# Patient Record
Sex: MALE | Race: OTHER | NOT HISPANIC OR LATINO | URBAN - METROPOLITAN AREA
[De-identification: names, ages, dates, MRNs, and addresses within clinical notes are randomized per-mention and may not be internally consistent; named-entity substitution may affect disease eponyms.]

---

## 2017-06-17 ENCOUNTER — INPATIENT (INPATIENT)
Facility: HOSPITAL | Age: 72
LOS: 2 days | Discharge: HOME | DRG: 313 | End: 2017-06-20
Attending: INTERNAL MEDICINE | Admitting: INTERNAL MEDICINE
Payer: MEDICARE

## 2017-06-17 VITALS
SYSTOLIC BLOOD PRESSURE: 99 MMHG | HEART RATE: 92 BPM | TEMPERATURE: 99 F | RESPIRATION RATE: 18 BRPM | DIASTOLIC BLOOD PRESSURE: 63 MMHG | OXYGEN SATURATION: 96 %

## 2017-06-17 DIAGNOSIS — I24.9 ACUTE ISCHEMIC HEART DISEASE, UNSPECIFIED: ICD-10-CM

## 2017-06-17 DIAGNOSIS — D69.6 THROMBOCYTOPENIA, UNSPECIFIED: ICD-10-CM

## 2017-06-17 DIAGNOSIS — D61.818 OTHER PANCYTOPENIA: ICD-10-CM

## 2017-06-17 DIAGNOSIS — R30.0 DYSURIA: ICD-10-CM

## 2017-06-17 DIAGNOSIS — R06.02 SHORTNESS OF BREATH: ICD-10-CM

## 2017-06-17 DIAGNOSIS — D64.9 ANEMIA, UNSPECIFIED: ICD-10-CM

## 2017-06-17 LAB
CK MB CFR SERPL CALC: 1.3 NG/ML — SIGNIFICANT CHANGE UP (ref 0–6.7)
CK MB CFR SERPL CALC: <1 NG/ML — SIGNIFICANT CHANGE UP (ref 0–6.7)
CK SERPL-CCNC: 60 U/L — SIGNIFICANT CHANGE UP (ref 30–200)
CK SERPL-CCNC: 74 U/L — SIGNIFICANT CHANGE UP (ref 30–200)
CRP SERPL-MCNC: 3.5 MG/DL — HIGH (ref 0–0.4)
D DIMER BLD IA.RAPID-MCNC: 615 NG/ML DDU — HIGH
ERYTHROCYTE [SEDIMENTATION RATE] IN BLOOD: 46 MM/HR — HIGH
FERRITIN SERPL-MCNC: 42.4 NG/ML — SIGNIFICANT CHANGE UP (ref 30–400)
FIBRINOGEN PPP-MCNC: 256 MG/DL — LOW (ref 258–438)
HAPTOGLOB SERPL-MCNC: <10 MG/DL — LOW (ref 34–200)
HBV CORE AB SER-ACNC: SIGNIFICANT CHANGE UP
HBV SURFACE AB SER-ACNC: SIGNIFICANT CHANGE UP
HBV SURFACE AG SER-ACNC: SIGNIFICANT CHANGE UP
HCV AB S/CO SERPL IA: 0.36 S/CO — SIGNIFICANT CHANGE UP
HCV AB SERPL-IMP: SIGNIFICANT CHANGE UP
INR BLD: 1.19 — HIGH (ref 0.88–1.16)
IRON SATN MFR SERPL: 33 % — SIGNIFICANT CHANGE UP (ref 16–55)
IRON SATN MFR SERPL: 95 UG/DL — SIGNIFICANT CHANGE UP (ref 45–165)
LDH SERPL L TO P-CCNC: 314 U/L — HIGH (ref 50–242)
PROTHROM AB SERPL-ACNC: 13.3 SEC — HIGH (ref 9.8–12.7)
RBC # BLD: 3.7 M/UL — LOW (ref 4.2–5.8)
RETICS/RBC NFR: 4.1 % — HIGH (ref 0.5–2.5)
TIBC SERPL-MCNC: 291 UG/DL — SIGNIFICANT CHANGE UP (ref 220–430)
TROPONIN T SERPL-MCNC: <0.01 NG/ML — SIGNIFICANT CHANGE UP (ref 0–0.01)
TROPONIN T SERPL-MCNC: <0.01 NG/ML — SIGNIFICANT CHANGE UP (ref 0–0.01)
UIBC SERPL-MCNC: 196 UG/DL — SIGNIFICANT CHANGE UP (ref 110–370)

## 2017-06-17 PROCEDURE — 93010 ELECTROCARDIOGRAM REPORT: CPT | Mod: GC

## 2017-06-17 PROCEDURE — 99285 EMERGENCY DEPT VISIT HI MDM: CPT | Mod: 25,GC

## 2017-06-17 PROCEDURE — 71010: CPT | Mod: 26

## 2017-06-17 RX ORDER — ASPIRIN/CALCIUM CARB/MAGNESIUM 324 MG
1 TABLET ORAL
Qty: 0 | Refills: 0 | COMMUNITY

## 2017-06-17 RX ORDER — ASPIRIN/CALCIUM CARB/MAGNESIUM 324 MG
81 TABLET ORAL DAILY
Qty: 0 | Refills: 0 | Status: DISCONTINUED | OUTPATIENT
Start: 2017-06-17 | End: 2017-06-20

## 2017-06-17 RX ORDER — NITROGLYCERIN 6.5 MG
0.4 CAPSULE, EXTENDED RELEASE ORAL ONCE
Qty: 0 | Refills: 0 | Status: COMPLETED | OUTPATIENT
Start: 2017-06-17 | End: 2017-06-17

## 2017-06-17 RX ORDER — POTASSIUM CHLORIDE 20 MEQ
40 PACKET (EA) ORAL ONCE
Qty: 0 | Refills: 0 | Status: COMPLETED | OUTPATIENT
Start: 2017-06-17 | End: 2017-06-17

## 2017-06-17 RX ORDER — FUROSEMIDE 40 MG
20 TABLET ORAL ONCE
Qty: 0 | Refills: 0 | Status: COMPLETED | OUTPATIENT
Start: 2017-06-17 | End: 2017-06-17

## 2017-06-17 RX ADMIN — Medication 40 MILLIEQUIVALENT(S): at 18:50

## 2017-06-17 RX ADMIN — Medication 0.4 MILLIGRAM(S): at 11:43

## 2017-06-17 RX ADMIN — Medication 20 MILLIGRAM(S): at 15:50

## 2017-06-17 RX ADMIN — Medication 81 MILLIGRAM(S): at 18:50

## 2017-06-17 NOTE — H&P ADULT - PROBLEM SELECTOR PLAN 1
- CP free and HD stable  - monitor on telemetry  - trend trops  - possible NST vs cath Monday per Dr. Hidalgo

## 2017-06-17 NOTE — CONSULT NOTE ADULT - SUBJECTIVE AND OBJECTIVE BOX
Hematology Oncology Consult Note (Dr Herring)    The patient was seen and examined at bedside.    MARIE LICEA is a 71y Male with no prior medical history presented to [facility] with substernal chest pain admitted to [facility] for ACS r/o. Pt reports past hx of CVA (5 yrs ago) but denies any other medical problems. He had a 30 pk yr smoking hx, heavy EtOH use in past. Pt reports that he has noted easy bruising of his skin but denies any overt signs of bleeding. No blood in stool, urine, or from mouth/nose. Pt denies any fevers, chills, night sweats or weight loss. He has no history of blood clots in past. Pt does not have regular medical follow up. He only medication at home is asa 81 mg daily. He has no recent prior labs. Pt's last c-scope was > 10 yrs ago. Pt has not noted any changes in color of his urine.       ROS is otherwise negative.    PAST MEDICAL & SURGICAL HISTORY:  CVA (cerebral vascular accident)  No pertinent past medical history  No significant past surgical history      Allergies: NKDA      Medications:  MEDICATIONS  (STANDING):  aspirin enteric coated 81milliGRAM(s) Oral daily      Social History: current smoker, regular alcohol use     FAMILY HISTORY:  Family history of CHF (congestive heart failure) (Mother)  No pertinent family history in first degree relatives      PHYSICAL EXAM:    T(F): 98.4, Max: 98.9 (06-17 @ 10:06)  HR: 84 (83 - 92)  BP: 118/58 (99/63 - 155/67)  RR: 18 (17 - 18)  SpO2: 97% (96% - 98%)    Daily Height in cm: 175.26 (2017 14:24)    Daily Weight in k.1 (2017 14:24)    Gen: well developed, well nourished, comfortable  HEENT: normocephalic/atraumatic, no conjunctival pallor, no scleral icterus, no oral thrush/mucosal bleeding/mucositis  Neck: supple, no masses, no JVD  Back: nontender  Cardiovascular: RR, nl S1S2, no murmurs/rubs/gallops  Respiratory: bibasilar rales  Gastrointestinal: BS+, soft, NT/ND, no masses, no splenomegaly, no hepatomegaly, no evidence for ascites  Extremities: no clubbing/cyanosis, b/l LE Edema, no calf tenderness, DP/PT 2+ b/l  Neurological: no focal deficits  Skin: no rash on visible skin  Lymph Nodes:  no cervical/supraclavicular LAD, no axillary/groin LAD                              11.6   3.7   )-----------( 73       ( 2017 10:27 )             34.6         CBC Full  -  ( 2017 10:27 )  WBC Count : 3.7 K/uL  Hemoglobin : 11.6 g/dL  Hematocrit : 34.6 %  Platelet Count - Automated : 75 K/uL  Mean Cell Volume : 95.1 fL  Mean Cell Hemoglobin : 31.9 pg  Mean Cell Hemoglobin Concentration : 33.5 g/dL  Auto Neutrophil % : 55.5 %  Auto Lymphocyte % : 35.6 %  Auto Monocyte % : 5.9 %  Auto Eosinophil % : 2.7 %  Auto Basophil % : 0.3 %            135  |  102  |  12  ----------------------------<  131<H>  3.7   |  22  |  0.80    Ca    8.8      2017 10:27    TPro  6.8  /  Alb  2.7<L>  /  TBili  2.0<H>  /  DBili  x   /  AST  36  /  ALT  11  /  AlkPhos  112      PT/INR - ( 2017 10:28 )   PT: 13.3 sec;   INR: 1.19             Haptoglobin <10

## 2017-06-17 NOTE — CONSULT NOTE ADULT - PROBLEM SELECTOR RECOMMENDATION 2
etiology unclear, MCV 95, retic count 4.1 (adequate marrow response)  check iron studies, b12 and folate  - peripheral smear shows spherocytes- LDH elevated and Hapto <10 and indirect bilirubin elevated, suggestive hemolytic picture ( no shistocytes on smear to suggest microangiopathic process)  - please start folic acid 1 mg daily (after folate level drawn)  - check direct godwin  - check stool occult (lack of c-scope screening)

## 2017-06-17 NOTE — ED PROVIDER NOTE - MEDICAL DECISION MAKING DETAILS
72 yo M with no reported medical history, presents with acute chest pain, associated with SOB. Will obtain labs, CXR. Nitro SL to be given for persistent chest pain.

## 2017-06-17 NOTE — H&P ADULT - PROBLEM SELECTOR PLAN 3
- Dr. Awad of heme consulted  - follow up reticulocyte count, iron studies, B12, folate, hepatitis, HIV, LDH, haptoglobin, coags, fibrinogen  - follow up abd US

## 2017-06-17 NOTE — ED PROVIDER NOTE - OBJECTIVE STATEMENT
72 yo M with no reported PMH who presents following episode of chest pain. Patient states around 3am he was awakened by his daughter and son-in-law arguing when suddenly he developed acute 8/10 chest pain, located sternally (non-radiating) and described as a heaviness, associated with shortness of breath and diaphoresis, not associated with nasuea/vomiting. He then attempted to go back to sleep, however the chest pain persisted. He attempted to walk to the hospital, however he was stopped by a doorman at a nearby building because of his appearance, after which EMS was called. Currently patient reports moderate chest pain 6/10, that waxes and wanes, described as heaviness, non-radiated, associated with SOB. 70 yo M with no reported PMH who presents following episode of chest pain. Patient states around 3am he was awakened by his daughter and son-in-law arguing when suddenly he developed acute 8/10 chest pain, located sternally (non-radiating) and described as a heaviness, associated with shortness of breath and diaphoresis, not associated with nausea/vomiting. He then attempted to go back to sleep, however the chest pain persisted. He attempted to walk to the hospital, however he was stopped by a doorman at a nearby building because of his appearance, after which EMS was called. Currently patient reports moderate chest pain 6/10, that waxes and wanes, described as heaviness, non-radiated, associated with SOB.

## 2017-06-17 NOTE — ED PROVIDER NOTE - ATTENDING CONTRIBUTION TO CARE
72 yo +smoker with chest pain, non radiating with shortness of breath. Described as sternal heaviness. no back pain. Given asa prior to arrival. given SL nitro in ED relieving discomfort, given symptoms, age, risk factors, discussed with cardiology for admission for ACS eval. Less likely pe/dissection given symptoms and cp relieved with nitro.

## 2017-06-17 NOTE — ED ADULT TRIAGE NOTE - CHIEF COMPLAINT QUOTE
Patient BIBA c/o chest pain , dizziness and sob while walking in the street this morning . Denies any palpitations nor vomiting .

## 2017-06-17 NOTE — CONSULT NOTE ADULT - PROBLEM SELECTOR RECOMMENDATION 9
etiology unclear, CRP and ESR elevated suggesting myelosupression due to heavy alcohol intake- will need to wait for abd ultrasound to determine if bone marrow biopsy necessary (if patient has liver cirrhosis and hypersplenism on ultrasound, likely no inherent bone marrow malignancy present)  - f/u full abd ultrasound

## 2017-06-17 NOTE — ED PROVIDER NOTE - PROGRESS NOTE DETAILS
Mor: 72 yo with chest pain, non radiating with shortness of breath. Described as sternal heaviness. Given asa prior to arrival.

## 2017-06-17 NOTE — H&P ADULT - NSHPLABSRESULTS_GEN_ALL_CORE
11.6   3.7   )-----------( 73       ( 17 Jun 2017 10:27 )             34.6       06-17    135  |  102  |  12  ----------------------------<  131<H>  3.7   |  22  |  0.80    Ca    8.8      17 Jun 2017 10:27    TPro  6.8  /  Alb  2.7<L>  /  TBili  2.0<H>  /  DBili  x   /  AST  36  /  ALT  11  /  AlkPhos  112  06-17          CARDIAC MARKERS ( 17 Jun 2017 10:27 )  x     / <0.01 ng/mL / 61 U/L / x     / <1.0 ng/mL

## 2017-06-17 NOTE — H&P ADULT - NSHPSOCIALHISTORY_GEN_ALL_CORE
Current ETOH user 1-2 beers/wk, former ETOH abuser, current cigarette smoker 1/2 PPD, former polysubstance abuser (cocaine, marajuana)

## 2017-06-17 NOTE — CONSULT NOTE ADULT - PROBLEM SELECTOR RECOMMENDATION 3
etiology multifactorial- no evidence of microangiopathic picture  decreased albumin, elevated PT and decreased fibrinogen suggest liver disease with hypersplenism resulting in decreased plts.  - fibrinogen low normal suggestive of possible DIC (however no shistocytes on smear)  - check hepatitis panel, HIV  - obtain collateral labs from outpt setting.  - will discuss case with Dr Herring etiology multifactorial- no evidence of microangiopathic picture  decreased albumin, elevated PT and decreased fibrinogen suggest liver disease with hypersplenism resulting in decreased plt- f/u abd ultrasound  - fibrinogen low normal suggestive of possible DIC (however no shistocytes on smear)  - check hepatitis panel, HIV  - obtain collateral labs from outpt setting.  - will discuss case with Dr Herring

## 2017-06-17 NOTE — H&P ADULT - FAMILY HISTORY
Mother  Still living? Unknown  Family history of CHF (congestive heart failure), Age at diagnosis: Age Unknown

## 2017-06-17 NOTE — ED ADULT NURSE NOTE - OBJECTIVE STATEMENT
70 y/o male w/ no PMH or on any daily meds c/o sharp 7/10 chest pain when walking with slight relief at rest.

## 2017-06-17 NOTE — H&P ADULT - PROBLEM SELECTOR PLAN 2
- O2 sat 84% RA, improved to 96% on 4L NC, appears overloaded on exam and CXR appears congested  - daily weights and I&Os  - lasix 20mg IV x 1 ordered, assess need for continued diuresis  - follow up echo  - follow up d-dimer

## 2017-06-17 NOTE — H&P ADULT - HISTORY OF PRESENT ILLNESS
Patient is a 72yo M, current smoker, former ETOH (currently drinks 1-2 beers/weekly) and polysubstance abuser, with FHx heart disease (mother and fathers family) and PMHx CVA (5yrs ago, no residual deficits) who was BIBEMS to [facility] ED on 6/17/17 complaining of 6/10, dull SSCP with associated SOB, palpitations and diaphoresis. Patient reports his family was arguing and he left the house and started walking when his symptoms started. Patient states his symptoms were mildly relieved with rest. Patient started to walk to the ED, but was stopped by a doorman who told him he looked terrible and called an ambulance. Patient also endorses orthopnea, LE edema and easy bruising. Upon arrival to [facility] ED Temp 98.9F, HR 92bpm, BP 99/63, RR 18, O2 sat 84% RA. Labs significant for pancytopenia (WBC 3.7, Hgb/Hct 11.6/34.6, Plt 75), neg trop x 1, . EKG revealed NSR @ 89bpm with no ischemic changes. Patient was given NTG SL 0.4mg x 1 with relief of chest pain. Patient is admitted to 5 Ur for rule out ACS and further cardiac management.

## 2017-06-17 NOTE — PROGRESS NOTE ADULT - SUBJECTIVE AND OBJECTIVE BOX
Pt is admitted for onset of chest pain and dyspnea  assoc with mild leg edema    PAST MEDICAL & SURGICAL HISTORY:  No pertinent past medical history  pending reconciliation   No significant past surgical history    MEDICATIONS  (STANDING):  none by history    cp relieved by NTG    MEDICATIONS  (PRN):      ICU Vital Signs Last 24 Hrs  T(C): 37.2, Max: 37.2 (06-17 @ 10:06)  T(F): 98.9, Max: 98.9 (06-17 @ 10:06)  HR: 89 (89 - 92)  BP: 120/57 (99/63 - 120/57)  BP(mean): --  ABP: --  ABP(mean): --  RR: 18 (18 - 18)  SpO2: 98% (96% - 98%)    Lungs decreased breath sounds at base  CV s1 s2   Abd soft  Etx 1 plus edema                          11.6   3.7   )-----------( 73       ( 17 Jun 2017 10:27 )             34.6   06-17    135  |  102  |  12  ----------------------------<  131<H>  3.7   |  22  |  0.80    Ca    8.8      17 Jun 2017 10:27    TPro  6.8  /  Alb  2.7<L>  /  TBili  2.0<H>  /  DBili  x   /  AST  36  /  ALT  11  /  AlkPhos  112  06-17  EKG NSR no acute change    CARDIAC MARKERS ( 17 Jun 2017 10:27 )  x     / <0.01 ng/mL / 61 U/L / x     / <1.0 ng/mL

## 2017-06-18 LAB
ALBUMIN SERPL ELPH-MCNC: 2.7 G/DL — LOW (ref 3.3–5)
ALP SERPL-CCNC: 105 U/L — SIGNIFICANT CHANGE UP (ref 40–120)
ALT FLD-CCNC: 11 U/L — SIGNIFICANT CHANGE UP (ref 10–45)
AMPHET UR-MCNC: NEGATIVE — SIGNIFICANT CHANGE UP
ANION GAP SERPL CALC-SCNC: 11 MMOL/L — SIGNIFICANT CHANGE UP (ref 5–17)
APPEARANCE UR: CLEAR — SIGNIFICANT CHANGE UP
APTT BLD: 32.2 SEC — SIGNIFICANT CHANGE UP (ref 27.5–37.4)
AST SERPL-CCNC: 34 U/L — SIGNIFICANT CHANGE UP (ref 10–40)
BARBITURATES UR SCN-MCNC: NEGATIVE — SIGNIFICANT CHANGE UP
BENZODIAZ UR-MCNC: NEGATIVE — SIGNIFICANT CHANGE UP
BILIRUB SERPL-MCNC: 1.8 MG/DL — HIGH (ref 0.2–1.2)
BILIRUB UR-MCNC: NEGATIVE — SIGNIFICANT CHANGE UP
BUN SERPL-MCNC: 11 MG/DL — SIGNIFICANT CHANGE UP (ref 7–23)
CALCIUM SERPL-MCNC: 8.5 MG/DL — SIGNIFICANT CHANGE UP (ref 8.4–10.5)
CHLORIDE SERPL-SCNC: 105 MMOL/L — SIGNIFICANT CHANGE UP (ref 96–108)
CO2 SERPL-SCNC: 22 MMOL/L — SIGNIFICANT CHANGE UP (ref 22–31)
COCAINE METAB.OTHER UR-MCNC: NEGATIVE — SIGNIFICANT CHANGE UP
COLOR SPEC: YELLOW — SIGNIFICANT CHANGE UP
CREAT SERPL-MCNC: 0.9 MG/DL — SIGNIFICANT CHANGE UP (ref 0.5–1.3)
DIFF PNL FLD: (no result)
FIBRINOGEN PPP-MCNC: 249 MG/DL — LOW (ref 258–438)
FOLATE SERPL-MCNC: 9.5 NG/ML — SIGNIFICANT CHANGE UP (ref 4.8–24.2)
GLUCOSE SERPL-MCNC: 91 MG/DL — SIGNIFICANT CHANGE UP (ref 70–99)
GLUCOSE UR QL: NEGATIVE — SIGNIFICANT CHANGE UP
HAPTOGLOB SERPL-MCNC: 12 MG/DL — LOW (ref 34–200)
HBA1C BLD-MCNC: 5.5 % — SIGNIFICANT CHANGE UP (ref 4–5.6)
HCT VFR BLD CALC: 34.8 % — LOW (ref 39–50)
HGB BLD-MCNC: 11.4 G/DL — LOW (ref 13–17)
KETONES UR-MCNC: NEGATIVE — SIGNIFICANT CHANGE UP
LDH SERPL L TO P-CCNC: 257 U/L — HIGH (ref 50–242)
LEUKOCYTE ESTERASE UR-ACNC: NEGATIVE — SIGNIFICANT CHANGE UP
MAGNESIUM SERPL-MCNC: 2.1 MG/DL — SIGNIFICANT CHANGE UP (ref 1.6–2.6)
MCHC RBC-ENTMCNC: 31.6 PG — SIGNIFICANT CHANGE UP (ref 27–34)
MCHC RBC-ENTMCNC: 32.8 G/DL — SIGNIFICANT CHANGE UP (ref 32–36)
MCV RBC AUTO: 96.4 FL — SIGNIFICANT CHANGE UP (ref 80–100)
METHADONE UR-MCNC: NEGATIVE — SIGNIFICANT CHANGE UP
NITRITE UR-MCNC: NEGATIVE — SIGNIFICANT CHANGE UP
OPIATES UR-MCNC: NEGATIVE — SIGNIFICANT CHANGE UP
PCP SPEC-MCNC: SIGNIFICANT CHANGE UP
PCP UR-MCNC: NEGATIVE — SIGNIFICANT CHANGE UP
PH UR: 6.5 — SIGNIFICANT CHANGE UP (ref 5–8)
PLATELET # BLD AUTO: 73 K/UL — LOW (ref 150–400)
POTASSIUM SERPL-MCNC: 4.5 MMOL/L — SIGNIFICANT CHANGE UP (ref 3.5–5.3)
POTASSIUM SERPL-SCNC: 4.5 MMOL/L — SIGNIFICANT CHANGE UP (ref 3.5–5.3)
PROT SERPL-MCNC: 6.7 G/DL — SIGNIFICANT CHANGE UP (ref 6–8.3)
PROT UR-MCNC: NEGATIVE MG/DL — SIGNIFICANT CHANGE UP
RBC # BLD: 3.61 M/UL — LOW (ref 4.2–5.8)
RBC # FLD: 18.6 % — HIGH (ref 10.3–16.9)
SODIUM SERPL-SCNC: 138 MMOL/L — SIGNIFICANT CHANGE UP (ref 135–145)
SP GR SPEC: <=1.005 — SIGNIFICANT CHANGE UP (ref 1–1.03)
THC UR QL: NEGATIVE — SIGNIFICANT CHANGE UP
UROBILINOGEN FLD QL: 0.2 E.U./DL — SIGNIFICANT CHANGE UP
VIT B12 SERPL-MCNC: 574 PG/ML — SIGNIFICANT CHANGE UP (ref 243–894)
WBC # BLD: 3.6 K/UL — LOW (ref 3.8–10.5)
WBC # FLD AUTO: 3.6 K/UL — LOW (ref 3.8–10.5)

## 2017-06-18 PROCEDURE — 71275 CT ANGIOGRAPHY CHEST: CPT | Mod: 26

## 2017-06-18 PROCEDURE — 76700 US EXAM ABDOM COMPLETE: CPT | Mod: 26

## 2017-06-18 RX ORDER — HEPARIN SODIUM 5000 [USP'U]/ML
5000 INJECTION INTRAVENOUS; SUBCUTANEOUS EVERY 8 HOURS
Qty: 0 | Refills: 0 | Status: DISCONTINUED | OUTPATIENT
Start: 2017-06-18 | End: 2017-06-18

## 2017-06-18 RX ORDER — NITROGLYCERIN 6.5 MG
0.4 CAPSULE, EXTENDED RELEASE ORAL ONCE
Qty: 0 | Refills: 0 | Status: COMPLETED | OUTPATIENT
Start: 2017-06-18 | End: 2017-06-18

## 2017-06-18 RX ADMIN — Medication 0.4 MILLIGRAM(S): at 00:39

## 2017-06-18 RX ADMIN — Medication 81 MILLIGRAM(S): at 15:43

## 2017-06-18 NOTE — PROGRESS NOTE ADULT - ASSESSMENT
Patient is a 72yo M, current smoker, former ETOH (currently drinks 1-2 beers/weekly) and polysubstance abuser, with FHx heart disease (mother and fathers family) and PMHx CVA (5yrs ago, no residual deficits) who was BIBEMS to [facility] ED on 6/17/17 complaining of 6/10, dull SSCP with associated SOB, palpitations and diaphoresis

## 2017-06-18 NOTE — PROGRESS NOTE ADULT - SUBJECTIVE AND OBJECTIVE BOX
Interventional Cardiology PA Adult Progress Note    Subjective Assessment: Patient seen and examined at bedside, no current cP.  	  MEDICATIONS:  aspirin enteric coated 81milliGRAM(s) Oral daily    [PHYSICAL EXAM:  TELEMETRY:  T(C): 36.3, Max: 36.9 (06-17 @ 20:50)  HR: 88 (76 - 88)  BP: 114/62 (93/49 - 126/58)  RR: 18 (16 - 18)  SpO2: 89% (89% - 97%)    I&O's Summary  I & Os for 24h ending 18 Jun 2017 07:00  =============================================  IN: 240 ml / OUT: 850 ml / NET: -610 ml    I & Os for current day (as of 18 Jun 2017 17:46)  =============================================  IN: 0 ml / OUT: 525 ml / NET: -525 ml    Ramon: No  Central/PICC/Mid Line: No                                         Appearance: Normal, NAD	  HEENT:   Normal oral mucosa, PERRL, EOMI	  Neck: Supple,  - JVD; Carotid Bruit   Cardiovascular: Normal S1 S2, No JVD, No murmurs,   Respiratory: Lungs clear to auscultation/No Rales, Rhonchi, Wheezing	  Gastrointestinal:  Soft, Non-tender, + BS	  Skin: No rashes, No ecchymoses, No cyanosis  Extremities: Normal range of motion, No clubbing, cyanosis or edema  Vascular: Peripheral pulses palpable 2+ bilaterally  Neurologic: Non-focal  Psychiatry: A & O x 3, Mood & affect appropriate  	    LABS:	 	  CARDIAC MARKERS:                        11.4   3.6   )-----------( 71       ( 18 Jun 2017 06:55 )             34.8     06-18    138  |  105  |  11  ----------------------------<  91  4.5   |  22  |  0.90    Ca    8.5      18 Jun 2017 06:55  Mg     2.1     06-18    TPro  6.7  /  Alb  2.7<L>  /  TBili  1.8<H>  /  DBili  x   /  AST  34  /  ALT  11  /  AlkPhos  105  06-18    HgA1c: Hemoglobin A1C, Whole Blood: 5.5 % (06-18 @ 06:55)    PT/INR - ( 17 Jun 2017 10:28 )   PT: 13.3 sec;   INR: 1.19     PTT - ( 18 Jun 2017 06:55 )  PTT:32.2 sec    ASSESSMENT/PLAN:

## 2017-06-18 NOTE — PROGRESS NOTE ADULT - SUBJECTIVE AND OBJECTIVE BOX
Pt feels better after sl NTG    PAST MEDICAL & SURGICAL HISTORY:  CVA (cerebral vascular accident)      No pertinent past medical history  No significant past surgical history    MEDICATIONS  (STANDING):  aspirin enteric coated 81milliGRAM(s) Oral daily    MEDICATIONS  (PRN):      ICU Vital Signs Last 24 Hrs  T(C): 36.9, Max: 36.9 (06-17 @ 20:50)  T(F): 98.5, Max: 98.5 (06-18 @ 09:11)  HR: 86 (83 - 87)  BP: 110/53 (93/49 - 155/67)  BP(mean): 71 (69 - 76)  ABP: --  ABP(mean): --  RR: 16 (16 - 18)  SpO2: 97% (95% - 97%)    Lungs clear  CV s1 S2   abd soft  ext stable                      11.4   3.6   )-----------( 71       ( 18 Jun 2017 06:55 )             34.8   06-18    138  |  105  |  11  ----------------------------<  91  4.5   |  22  |  0.90    Ca    8.5      18 Jun 2017 06:55  Mg     2.1     06-18    TPro  6.7  /  Alb  2.7<L>  /  TBili  1.8<H>  /  DBili  x   /  AST  34  /  ALT  11  /  AlkPhos  105  06-18      CARDIAC MARKERS ( 17 Jun 2017 22:56 )  x     / <0.01 ng/mL / 60 U/L / x     / <1.0 ng/mL  CARDIAC MARKERS ( 17 Jun 2017 17:05 )  x     / <0.01 ng/mL / 74 U/L / x     / 1.3 ng/mL  CARDIAC MARKERS ( 17 Jun 2017 10:27 )  x     / <0.01 ng/mL / 61 U/L / x     / <1.0 ng/mL

## 2017-06-18 NOTE — PROGRESS NOTE ADULT - ASSESSMENT
R/O ACS  as chest pain continues  consider cardiac Cath vs stress thallium  Echo pending R/O ACS  as chest pain continues  consider cardiac Cath vs stress thallium  Echo pending     Pancytopenia   Heme follow UP

## 2017-06-18 NOTE — PROGRESS NOTE ADULT - PROBLEM SELECTOR PLAN 2
etiology unclear, MCV 95, retic count 4.1 (adequate marrow response)  check iron studies, b12 and folate  - peripheral smear shows spherocytes- LDH elevated and Hapto <10 and indirect bilirubin elevated, suggestive hemolytic picture ( no shistocytes on smear to suggest microangiopathic process)  - please start folic acid 1 mg daily (after folate level drawn)  - check direct godwin  - check stool occult (lack of c-scope screening). etiology unclear, MCV 95, retic count 4.1 (adequate marrow response)  iron studies w/ no evidence of iron def, pending b12 and folate  check stool occult- given liver cirrhosis, concern for bleeding varices versus alcoholic gastritis  - peripheral smear shows spherocytes- LDH elevated and Hapto <10 and indirect bilirubin elevated, suggestive hemolytic picture ( no shistocytes on smear to suggest microangiopathic process)  - c/w folic acid 1 mg daily

## 2017-06-18 NOTE — PROGRESS NOTE ADULT - ASSESSMENT
70 yo M with no medical hx presenting with pancytopenia likely 2/2 to underlying cirhosis with hypersplenism

## 2017-06-18 NOTE — PROGRESS NOTE ADULT - PROBLEM SELECTOR PLAN 2
Patient appears [facility] today, satting well RA  - S/p Lasix 20mg IV x1 on 6/17, continue to assess need for Lasix  - f/u official CXR, Echo ordered.  - daily weights and I&Os  - D dimer elevated 615, CT PE Protocol negative for PE.

## 2017-06-18 NOTE — CHART NOTE - NSCHARTNOTEFT_GEN_A_CORE
Pt c/o chest pain, 5/10 L sided, states feels better than before, relieved with SL nitro, VSS, EKG unchanged from prior, no acute STT changes. Trop negative x3. Will continue to monitor

## 2017-06-18 NOTE — PROGRESS NOTE ADULT - SUBJECTIVE AND OBJECTIVE BOX
Hematology Oncology Consult Note (Dr Herring)    The patient was seen and examined at bedside.    no overnight events, no complaints. no further chest pain. ACS w/u in progress, plan for stress test angelo    ROS is otherwise negative.    PHYSICAL EXAM:    ICU Vital Signs Last 24 Hrs  T(C): 36.3, Max: 36.9 (06-17 @ 20:50)  T(F): 97.3, Max: 98.5 (06-18 @ 09:11)  HR: 88 (76 - 88)  BP: 114/62 (93/49 - 126/58)  BP(mean): 71 (69 - 76)  RR: 18 (16 - 18)  SpO2: 89% (89% - 97%)      Gen: well developed, well nourished, comfortable  HEENT: normocephalic/atraumatic, no conjunctival pallor, no scleral icterus, no oral thrush/mucosal bleeding/mucositis  Neck: supple, no masses, no JVD  Back: nontender  Cardiovascular: RR, nl S1S2, no murmurs/rubs/gallops  Respiratory: bibasilar rales  Gastrointestinal: BS+, soft, NT/ND, no masses, + splenomegaly, no evidence for ascites  Extremities: no clubbing/cyanosis, b/l LE Edema, no calf tenderness, DP/PT 2+ b/l  Neurological: no focal deficits  Skin: no rash on visible skin  Lymph Nodes:  no cervical/supraclavicular LAD, no axillary/groin LAD                              11.4   3.6   )-----------( 71       ( 18 Jun 2017 06:55 )             34.8         CBC Full  -  ( 18 Jun 2017 06:55 )  WBC Count : 3.6 K/uL  Hemoglobin : 11.4 g/dL  Hematocrit : 34.8 %  Platelet Count - Automated : 73 K/uL  Mean Cell Volume : 96.4 fL  Mean Cell Hemoglobin : 31.6 pg  Mean Cell Hemoglobin Concentration : 32.8 g/dL        06-18    138  |  105  |  11  ----------------------------<  91  4.5   |  22  |  0.90    Ca    8.5      18 Jun 2017 06:55  Mg     2.1     06-18    TPro  6.7  /  Alb  2.7<L>  /  TBili  1.8<H>  /  DBili  x   /  AST  34  /  ALT  11  /  AlkPhos  105  06-18        PT/INR - ( 17 Jun 2017 10:28 )   PT: 13.3 sec;   INR: 1.19          PTT - ( 18 Jun 2017 06:55 )  PTT:32.2 sec          Haptoglobin <10

## 2017-06-19 LAB
ANION GAP SERPL CALC-SCNC: 11 MMOL/L — SIGNIFICANT CHANGE UP (ref 5–17)
APTT BLD: 33.7 SEC — SIGNIFICANT CHANGE UP (ref 27.5–37.4)
BUN SERPL-MCNC: 10 MG/DL — SIGNIFICANT CHANGE UP (ref 7–23)
CALCIUM SERPL-MCNC: 8.8 MG/DL — SIGNIFICANT CHANGE UP (ref 8.4–10.5)
CHLORIDE SERPL-SCNC: 102 MMOL/L — SIGNIFICANT CHANGE UP (ref 96–108)
CHOLEST SERPL-MCNC: 116 MG/DL — SIGNIFICANT CHANGE UP (ref 10–199)
CO2 SERPL-SCNC: 22 MMOL/L — SIGNIFICANT CHANGE UP (ref 22–31)
CREAT SERPL-MCNC: 0.9 MG/DL — SIGNIFICANT CHANGE UP (ref 0.5–1.3)
CULTURE RESULTS: SIGNIFICANT CHANGE UP
GLUCOSE SERPL-MCNC: 101 MG/DL — HIGH (ref 70–99)
HCT VFR BLD CALC: 37 % — LOW (ref 39–50)
HDLC SERPL-MCNC: 41 MG/DL — SIGNIFICANT CHANGE UP (ref 40–125)
HGB BLD-MCNC: 11.8 G/DL — LOW (ref 13–17)
INR BLD: 1.16 — SIGNIFICANT CHANGE UP (ref 0.88–1.16)
LIPID PNL WITH DIRECT LDL SERPL: 60 MG/DL — SIGNIFICANT CHANGE UP
MAGNESIUM SERPL-MCNC: 2.1 MG/DL — SIGNIFICANT CHANGE UP (ref 1.6–2.6)
MCHC RBC-ENTMCNC: 30.7 PG — SIGNIFICANT CHANGE UP (ref 27–34)
MCHC RBC-ENTMCNC: 31.9 G/DL — LOW (ref 32–36)
MCV RBC AUTO: 96.4 FL — SIGNIFICANT CHANGE UP (ref 80–100)
PLATELET # BLD AUTO: 88 K/UL — LOW (ref 150–400)
POTASSIUM SERPL-MCNC: 4.3 MMOL/L — SIGNIFICANT CHANGE UP (ref 3.5–5.3)
POTASSIUM SERPL-SCNC: 4.3 MMOL/L — SIGNIFICANT CHANGE UP (ref 3.5–5.3)
PROTHROM AB SERPL-ACNC: 12.9 SEC — HIGH (ref 9.8–12.7)
RBC # BLD: 3.84 M/UL — LOW (ref 4.2–5.8)
RBC # FLD: 18.5 % — HIGH (ref 10.3–16.9)
SODIUM SERPL-SCNC: 135 MMOL/L — SIGNIFICANT CHANGE UP (ref 135–145)
SPECIMEN SOURCE: SIGNIFICANT CHANGE UP
TOTAL CHOLESTEROL/HDL RATIO MEASUREMENT: 2.8 RATIO — LOW (ref 3.4–9.6)
TRIGL SERPL-MCNC: 73 MG/DL — SIGNIFICANT CHANGE UP (ref 10–149)
WBC # BLD: 3.9 K/UL — SIGNIFICANT CHANGE UP (ref 3.8–10.5)
WBC # FLD AUTO: 3.9 K/UL — SIGNIFICANT CHANGE UP (ref 3.8–10.5)

## 2017-06-19 PROCEDURE — 93018 CV STRESS TEST I&R ONLY: CPT

## 2017-06-19 PROCEDURE — 93016 CV STRESS TEST SUPVJ ONLY: CPT

## 2017-06-19 PROCEDURE — 78452 HT MUSCLE IMAGE SPECT MULT: CPT | Mod: 26

## 2017-06-19 RX ADMIN — Medication 81 MILLIGRAM(S): at 11:39

## 2017-06-19 NOTE — PROGRESS NOTE ADULT - PROBLEM SELECTOR PLAN 3
Pancytopenia, Heme service consulted  - Likely secondary to cirrhosis (CT scan revealing cirrhotic liver w/ splenomegaly)  - Hep panel negative, Iron studies wnl, , Retic 4.1  - f/u Folate level and Start folic Acid 1mg  - Abd US 06/18/17 revealed minimally enlarged spleen measuring 13.5 cm. Small liver consistent with cirrhosis.  - f/u further Recs, patient to follow up with Dr. Hayden.
Pancytopenia, Heme service consulted  - Likely secondary to cirrhosis (CT scan revealing cirrhotic liver w/ splenomegaly)  - Hep panel negative, Iron studies wnl, , Retic 4.1  - f/u Folate level and Start folic Acid 1mg  - Abd US performed, f/u Results  - f/u further Recs, patient to follow up with Dr. Hayden.
etiology related to liver cirrhosis and destruction 2/2 to splenomegaly.

## 2017-06-19 NOTE — PROGRESS NOTE ADULT - SUBJECTIVE AND OBJECTIVE BOX
No current chest pain noted  ICU Vital Signs Last 24 Hrs  T(C): 36.6, Max: 37.4 (06-18 @ 21:27)  T(F): 97.8, Max: 99.3 (06-18 @ 21:27)  HR: 77 (76 - 88)  BP: 100/50 (98/54 - 126/58)  BP(mean): --  ABP: --  ABP(mean): --  RR: 16 (16 - 18)  SpO2: 95% (89% - 97%)    MEDICATIONS  (STANDING):  aspirin enteric coated 81milliGRAM(s) Oral daily    MEDICATIONS  (PRN):    PAST MEDICAL & SURGICAL HISTORY:  CVA (cerebral vascular accident)  No pertinent past medical history  No significant past surgical history        CT chest   No PE seen   Lung congestion /fibrosis  COPD  Cirrhosis    CV S1 S2   Abd soft  Ext stable                          11.8   3.9   )-----------( 86       ( 19 Jun 2017 06:33 )             37.0   06-19    135  |  102  |  10  ----------------------------<  101<H>  4.3   |  22  |  0.90    Ca    8.8      19 Jun 2017 06:33  Mg     2.1     06-19    TPro  6.7  /  Alb  2.7<L>  /  TBili  1.8<H>  /  DBili  x   /  AST  34  /  ALT  11  /  AlkPhos  105  06-18  CARDIAC MARKERS ( 17 Jun 2017 22:56 )  x     / <0.01 ng/mL / 60 U/L / x     / <1.0 ng/mL  CARDIAC MARKERS ( 17 Jun 2017 17:05 )  x     / <0.01 ng/mL / 74 U/L / x     / 1.3 ng/mL  CARDIAC MARKERS ( 17 Jun 2017 10:27 )  x     / <0.01 ng/mL / 61 U/L / x     / <1.0 ng/mL

## 2017-06-19 NOTE — PROGRESS NOTE ADULT - ASSESSMENT
Patient is a 70yo M, current smoker, former ETOH (currently drinks 1-2 beers/weekly) and polysubstance abuser, with FHx heart disease (mother and fathers family) and PMHx CVA (5yrs ago, no residual deficits) who was BIBEMS to [facility] ED on 6/17/17 complaining of 6/10, dull SSCP with associated SOB, palpitations and diaphoresis

## 2017-06-19 NOTE — PROGRESS NOTE ADULT - PROBLEM SELECTOR PLAN 2
Patient appears euvolemic today, satting well RA  - Echocardiogram in AM, please follow up results  - S/p Lasix 20mg IV x1 on 6/17, continue to assess need for Lasix  - f/u official CXR, Echo ordered.  - daily weights and I&Os  - D dimer elevated 615, CT PE Protocol negative for PE.

## 2017-06-19 NOTE — PROGRESS NOTE ADULT - SUBJECTIVE AND OBJECTIVE BOX
Interventional Cardiology PA Adult Progress Note    Subjective Assessment- Pt seen and examined at bedside. Feeling well, denies CP, SOB, palpitations.  	  MEDICATIONS:  aspirin enteric coated 81milliGRAM(s) Oral daily      	    [PHYSICAL EXAM:  TELEMETRY:  T(C): 36.4, Max: 37.4 (06-18 @ 21:27)  HR: 79 (77 - 87)  BP: 125/70 (98/54 - 125/70)  RR: 16 (16 - 16)  SpO2: 96% (95% - 98%)  Wt(kg): --  I&O's Summary  I & Os for 24h ending 19 Jun 2017 07:00  =============================================  IN: 0 ml / OUT: 1425 ml / NET: -1425 ml    I & Os for current day (as of 19 Jun 2017 17:58)  =============================================  IN: 0 ml / OUT: 600 ml / NET: -600 ml      Weight (kg): 77 (06-19 @ 06:05)  Ramon:  Central/PICC/Mid Line:                                         Appearance: Normal	  HEENT:   Normal oral mucosa, PERRL, EOMI	  Neck: Supple, - JVD; Carotid Bruit   Cardiovascular: Normal S1 S2, No JVD, No murmurs,   Respiratory: Lungs clear to auscultation, No Rales, Rhonchi, Wheezing	  Gastrointestinal:  Soft, Non-tender, + BS	  Skin: No rashes, No ecchymoses, No cyanosis  Extremities: Normal range of motion, No clubbing, cyanosis or edema  Vascular: Peripheral pulses palpable 2+ bilaterally  Neurologic: Non-focal  Psychiatry: A & O x 3, Mood & affect appropriate      	    ECG:  	  RADIOLOGY:   DIAGNOSTIC TESTING:  [ ] Echocardiogram:  [ ]  Catheterization:  [ ] Stress Test:    [ ] RICCARDO  OTHER: 	    LABS:	 	  CARDIAC MARKERS:                                  11.8   3.9   )-----------( 86       ( 19 Jun 2017 06:33 )             37.0     06-19    135  |  102  |  10  ----------------------------<  101<H>  4.3   |  22  |  0.90    Ca    8.8      19 Jun 2017 06:33  Mg     2.1     06-19    TPro  6.7  /  Alb  2.7<L>  /  TBili  1.8<H>  /  DBili  x   /  AST  34  /  ALT  11  /  AlkPhos  105  06-18    proBNP:   Lipid Profile:   HgA1c:   TSH:   PT/INR - ( 19 Jun 2017 06:33 )   PT: 12.9 sec;   INR: 1.16          PTT - ( 19 Jun 2017 06:33 )  PTT:33.7 sec

## 2017-06-19 NOTE — PROGRESS NOTE ADULT - PROBLEM SELECTOR PLAN 1
CP free today, CE negative x3  - NPO after midnight for NST tomorrow.  - Continue ASA 81mg QD  - f/u lipid panel
CP free today, CE negative x3  - NST 06/19/17 WNL  - Continue ASA 81mg QD  - Lipid Panel WNL
etiology 2/2 to underlying liver cirrhosis and splenomegaly as noted on CT scan, awaiting abd US to further evaluate.

## 2017-06-20 VITALS
OXYGEN SATURATION: 96 % | HEART RATE: 83 BPM | RESPIRATION RATE: 16 BRPM | DIASTOLIC BLOOD PRESSURE: 63 MMHG | SYSTOLIC BLOOD PRESSURE: 115 MMHG

## 2017-06-20 LAB
ANION GAP SERPL CALC-SCNC: 10 MMOL/L — SIGNIFICANT CHANGE UP (ref 5–17)
BUN SERPL-MCNC: 14 MG/DL — SIGNIFICANT CHANGE UP (ref 7–23)
CALCIUM SERPL-MCNC: 9.1 MG/DL — SIGNIFICANT CHANGE UP (ref 8.4–10.5)
CHLORIDE SERPL-SCNC: 103 MMOL/L — SIGNIFICANT CHANGE UP (ref 96–108)
CO2 SERPL-SCNC: 21 MMOL/L — LOW (ref 22–31)
CREAT SERPL-MCNC: 0.9 MG/DL — SIGNIFICANT CHANGE UP (ref 0.5–1.3)
GLUCOSE SERPL-MCNC: 96 MG/DL — SIGNIFICANT CHANGE UP (ref 70–99)
HCT VFR BLD CALC: 34.2 % — LOW (ref 39–50)
HGB BLD-MCNC: 11.4 G/DL — LOW (ref 13–17)
MAGNESIUM SERPL-MCNC: 2 MG/DL — SIGNIFICANT CHANGE UP (ref 1.6–2.6)
MCHC RBC-ENTMCNC: 31.7 PG — SIGNIFICANT CHANGE UP (ref 27–34)
MCHC RBC-ENTMCNC: 33.3 G/DL — SIGNIFICANT CHANGE UP (ref 32–36)
MCV RBC AUTO: 95 FL — SIGNIFICANT CHANGE UP (ref 80–100)
PLATELET # BLD AUTO: 78 K/UL — LOW (ref 150–400)
POTASSIUM SERPL-MCNC: 4.2 MMOL/L — SIGNIFICANT CHANGE UP (ref 3.5–5.3)
POTASSIUM SERPL-SCNC: 4.2 MMOL/L — SIGNIFICANT CHANGE UP (ref 3.5–5.3)
RBC # BLD: 3.6 M/UL — LOW (ref 4.2–5.8)
RBC # FLD: 18.6 % — HIGH (ref 10.3–16.9)
SODIUM SERPL-SCNC: 134 MMOL/L — LOW (ref 135–145)
WBC # BLD: 3.7 K/UL — LOW (ref 3.8–10.5)
WBC # FLD AUTO: 3.7 K/UL — LOW (ref 3.8–10.5)

## 2017-06-20 PROCEDURE — 80048 BASIC METABOLIC PNL TOTAL CA: CPT

## 2017-06-20 PROCEDURE — 83615 LACTATE (LD) (LDH) ENZYME: CPT

## 2017-06-20 PROCEDURE — 36415 COLL VENOUS BLD VENIPUNCTURE: CPT

## 2017-06-20 PROCEDURE — 71045 X-RAY EXAM CHEST 1 VIEW: CPT

## 2017-06-20 PROCEDURE — 80053 COMPREHEN METABOLIC PANEL: CPT

## 2017-06-20 PROCEDURE — 83036 HEMOGLOBIN GLYCOSYLATED A1C: CPT

## 2017-06-20 PROCEDURE — 85027 COMPLETE CBC AUTOMATED: CPT

## 2017-06-20 PROCEDURE — 83880 ASSAY OF NATRIURETIC PEPTIDE: CPT

## 2017-06-20 PROCEDURE — 99285 EMERGENCY DEPT VISIT HI MDM: CPT | Mod: 25

## 2017-06-20 PROCEDURE — 82550 ASSAY OF CK (CPK): CPT

## 2017-06-20 PROCEDURE — A9505: CPT

## 2017-06-20 PROCEDURE — 93005 ELECTROCARDIOGRAM TRACING: CPT

## 2017-06-20 PROCEDURE — 76700 US EXAM ABDOM COMPLETE: CPT

## 2017-06-20 PROCEDURE — 86140 C-REACTIVE PROTEIN: CPT

## 2017-06-20 PROCEDURE — 82607 VITAMIN B-12: CPT

## 2017-06-20 PROCEDURE — 80307 DRUG TEST PRSMV CHEM ANLYZR: CPT

## 2017-06-20 PROCEDURE — 83550 IRON BINDING TEST: CPT

## 2017-06-20 PROCEDURE — 83735 ASSAY OF MAGNESIUM: CPT

## 2017-06-20 PROCEDURE — 80061 LIPID PANEL: CPT

## 2017-06-20 PROCEDURE — 87086 URINE CULTURE/COLONY COUNT: CPT

## 2017-06-20 PROCEDURE — 85610 PROTHROMBIN TIME: CPT

## 2017-06-20 PROCEDURE — 85384 FIBRINOGEN ACTIVITY: CPT

## 2017-06-20 PROCEDURE — 87340 HEPATITIS B SURFACE AG IA: CPT

## 2017-06-20 PROCEDURE — 93017 CV STRESS TEST TRACING ONLY: CPT

## 2017-06-20 PROCEDURE — 86803 HEPATITIS C AB TEST: CPT

## 2017-06-20 PROCEDURE — 85045 AUTOMATED RETICULOCYTE COUNT: CPT

## 2017-06-20 PROCEDURE — 71275 CT ANGIOGRAPHY CHEST: CPT

## 2017-06-20 PROCEDURE — 85025 COMPLETE CBC W/AUTO DIFF WBC: CPT

## 2017-06-20 PROCEDURE — 86704 HEP B CORE ANTIBODY TOTAL: CPT

## 2017-06-20 PROCEDURE — 84484 ASSAY OF TROPONIN QUANT: CPT

## 2017-06-20 PROCEDURE — 85652 RBC SED RATE AUTOMATED: CPT

## 2017-06-20 PROCEDURE — 85730 THROMBOPLASTIN TIME PARTIAL: CPT

## 2017-06-20 PROCEDURE — 83010 ASSAY OF HAPTOGLOBIN QUANT: CPT

## 2017-06-20 PROCEDURE — 85379 FIBRIN DEGRADATION QUANT: CPT

## 2017-06-20 PROCEDURE — 82746 ASSAY OF FOLIC ACID SERUM: CPT

## 2017-06-20 PROCEDURE — 82553 CREATINE MB FRACTION: CPT

## 2017-06-20 PROCEDURE — 78452 HT MUSCLE IMAGE SPECT MULT: CPT

## 2017-06-20 PROCEDURE — 81001 URINALYSIS AUTO W/SCOPE: CPT

## 2017-06-20 PROCEDURE — 82728 ASSAY OF FERRITIN: CPT

## 2017-06-20 PROCEDURE — 86706 HEP B SURFACE ANTIBODY: CPT

## 2017-06-20 PROCEDURE — A9500: CPT

## 2017-06-20 PROCEDURE — 93306 TTE W/DOPPLER COMPLETE: CPT

## 2017-06-20 RX ORDER — FOLIC ACID 0.8 MG
1 TABLET ORAL
Qty: 30 | Refills: 2 | OUTPATIENT
Start: 2017-06-20 | End: 2017-09-17

## 2017-06-20 RX ORDER — FOLIC ACID 0.8 MG
1 TABLET ORAL DAILY
Qty: 0 | Refills: 0 | Status: DISCONTINUED | OUTPATIENT
Start: 2017-06-20 | End: 2017-06-20

## 2017-06-20 RX ADMIN — Medication 1 MILLIGRAM(S): at 13:21

## 2017-06-20 RX ADMIN — Medication 81 MILLIGRAM(S): at 11:49

## 2017-06-20 NOTE — DISCHARGE NOTE ADULT - PATIENT PORTAL LINK FT
“You can access the [facility] Patient Portal, offered by [facility], by registering with the following website: [email or web address]”

## 2017-06-20 NOTE — DISCHARGE NOTE ADULT - PLAN OF CARE
You presented with Chest pain and shortness of breath.  Your cardiac work up in the hospital was negative.  Your heart labs were normal.  an Echocardiogram revealed normal heart function.  A stress test was normal.  A CT scan of your chest did not reveal any clots in your Please follow up with your outpatient primary care doctor in 1-2 weeks at [place]. Please follow up with Dr. Hayden, call to make an appointment.  Follow up with planned outpatient GI appointment. You were found to have anemia, low blood count.  These low blood levels are consistent with liver cirrhosis probably secondary to alcohol use.    - continue to take folic acid 1mg daily  - STOP alcohol use. You presented with Chest pain and shortness of breath.  Your cardiac work up in the hospital was negative.  Your heart labs were normal.  an Echocardiogram revealed normal heart function.  A stress test was normal.  A CT scan of your chest did not reveal any clots in your lungs.  - continue to take aspirin 81mg daily.

## 2017-06-20 NOTE — DISCHARGE NOTE ADULT - ADDITIONAL INSTRUCTIONS
- Please follow up with your primary care doctor  - please follow up with the Hematologist Dr. Hayden. - Please follow up with your primary care doctor in 1-2 weeks.  Please discuss with PMD regarding home health aid or referrel to nursing care as patient high risk of leaving home.  - please follow up with the Hematologist Dr. Hayden.  Call the office to make an appointment.

## 2017-06-20 NOTE — DISCHARGE NOTE ADULT - MEDICATION SUMMARY - MEDICATIONS TO TAKE
I will START or STAY ON the medications listed below when I get home from the hospital:    aspirin 81 mg oral tablet  -- 1 tab(s) by mouth once a day  -- Indication: For CAD prevention    folic acid 1 mg oral tablet  -- 1 tab(s) by mouth once a day  -- Indication: For folic acid supplement

## 2017-06-20 NOTE — DISCHARGE NOTE ADULT - CARE PLAN
Principal Discharge DX:	Chest pain  Instructions for follow-up, activity and diet:	You presented with Chest pain and shortness of breath.  Your cardiac work up in the hospital was negative.  Your heart labs were normal.  an Echocardiogram revealed normal heart function.  A stress test was normal.  A CT scan of your chest did not reveal any clots in your  Secondary Diagnosis:	Thrombocytopenia Principal Discharge DX:	Chest pain  Goal:	Please follow up with your outpatient primary care doctor in 1-2 weeks at [place].  Instructions for follow-up, activity and diet:	You presented with Chest pain and shortness of breath.  Your cardiac work up in the hospital was negative.  Your heart labs were normal.  an Echocardiogram revealed normal heart function.  A stress test was normal.  A CT scan of your chest did not reveal any clots in your lungs.  - continue to take aspirin 81mg daily.  Secondary Diagnosis:	Thrombocytopenia  Goal:	Please follow up with Dr. Hayden, call to make an appointment.  Follow up with planned outpatient GI appointment.  Instructions for follow-up, activity and diet:	You were found to have anemia, low blood count.  These low blood levels are consistent with liver cirrhosis probably secondary to alcohol use.    - continue to take folic acid 1mg daily  - STOP alcohol use. Principal Discharge DX:	Chest pain  Goal:	Please follow up with your outpatient primary care doctor in 1-2 weeks at [place].  Instructions for follow-up, activity and diet:	You presented with Chest pain and shortness of breath.  Your cardiac work up in the hospital was negative.  Your heart labs were normal.  an Echocardiogram revealed normal heart function.  A stress test was normal.  A CT scan of your chest did not reveal any clots in your lungs.  - continue to take aspirin 81mg daily.  Secondary Diagnosis:	Thrombocytopenia  Goal:	Please follow up with Dr. Hayden, call to make an appointment.  Follow up with planned outpatient GI appointment.  Instructions for follow-up, activity and diet:	You were found to have anemia, low blood count.  These low blood levels are consistent with liver cirrhosis probably secondary to alcohol use.    - continue to take folic acid 1mg daily  - STOP alcohol use. Principal Discharge DX:	Chest pain  Goal:	Please follow up with your outpatient primary care doctor in 1-2 weeks at [place].  Instructions for follow-up, activity and diet:	You presented with Chest pain and shortness of breath.  Your cardiac work up in the hospital was negative.  Your heart labs were normal.  an Echocardiogram revealed normal heart function.  A stress test was normal.  A CT scan of your chest did not reveal any clots in your lungs.  - continue to take aspirin 81mg daily.  Secondary Diagnosis:	Thrombocytopenia  Goal:	Please follow up with Dr. Hayden, call to make an appointment.  Follow up with planned outpatient GI appointment.  Instructions for follow-up, activity and diet:	You were found to have anemia, low blood count.  These low blood levels are consistent with liver cirrhosis probably secondary to alcohol use.    - continue to take folic acid 1mg daily  - STOP alcohol use.

## 2017-06-20 NOTE — PROGRESS NOTE ADULT - SUBJECTIVE AND OBJECTIVE BOX
Pt is stable  No further CP    PAST MEDICAL & SURGICAL HISTORY:  CVA (cerebral vascular accident)    No significant past surgical history    MEDICATIONS  (STANDING):  aspirin enteric coated 81milliGRAM(s) Oral daily    MEDICATIONS  (PRN):      ICU Vital Signs Last 24 Hrs  T(C): 37, Max: 37.1 (06-20 @ 01:00)  T(F): 98.6, Max: 98.7 (06-20 @ 01:00)  HR: 97 (79 - 97)  BP: 113/97 (106/53 - 125/70)  BP(mean): --  ABP: --  ABP(mean): --  RR: 15 (15 - 16)  SpO2: 96% (95% - 97%)      Lungs clear  PE ruled out by CTA   enlarged mediastinal lymph nodes  early fibrosis  COPD   CV  S1 S2   Abd soft  Ext  stable                          11.4   3.7   )-----------( 78       ( 20 Jun 2017 05:54 )             34.2   06-20    134<L>  |  103  |  14  ----------------------------<  96  4.2   |  21<L>  |  0.90    Ca    9.1      20 Jun 2017 05:55  Mg     2.0     06-20    PT/INR - ( 19 Jun 2017 06:33 )   PT: 12.9 sec;   INR: 1.16          PTT - ( 19 Jun 2017 06:33 )  PTT:33.7 sec      Stress thallium neg for ischemia

## 2017-06-20 NOTE — DISCHARGE NOTE ADULT - HOSPITAL COURSE
Patient is a 72yo M, current smoker, former ETOH (currently drinks 1-2 beers/weekly) and polysubstance abuser, with FHx heart disease (mother and fathers family) and PMHx CVA (5yrs ago, no residual deficits) who was BIBEMS to [facility] ED on 6/17/17 complaining of 6/10, dull SSCP with associated SOB, palpitations and diaphoresis. Patient reports his family was arguing and he left the house and started walking when his symptoms started. Patient states his symptoms were mildly relieved with rest. Patient started to walk to the ED, but was stopped by a doorman who told him he looked terrible and called an ambulance. Patient also endorses orthopnea, LE edema and easy bruising. Upon arrival to [facility] ED Temp 98.9F, HR 92bpm, BP 99/63, RR 18, O2 sat 84% RA. Labs significant for pancytopenia (WBC 3.7, Hgb/Hct 11.6/34.6, Plt 75), neg trop x 1, . EKG revealed NSR @ 89bpm with no ischemic changes. Patient was given NTG SL 0.4mg x 1 with relief of chest pain. Patient is admitted to [facility] for rule out ACS and further cardiac management.    Patient is a 72yo M, current smoker, former ETOH (currently drinks 1-2 beers/weekly) and polysubstance abuser, with FHx heart disease (mother and fathers family) and PMHx CVA (5yrs ago, no residual deficits) who was BIBEMS to [facility] ED on 6/17/17 complaining of 6/10, dull SSCP with associated SOB, palpitations and diaphoresis    Problem/Plan - 1:  ·  Problem: ACS (acute coronary syndrome).  Plan: CP free today, CE negative x3  - NST 06/19/17 WNL  - Continue ASA 81mg QD  - Lipid Panel WNL.     Problem/Plan - 2:  ·  Problem: Shortness of breath.  Plan: Patient appears euvolemic today, satting well RA  - Echocardiogram in AM, please follow up results  - S/p Lasix 20mg IV x1 on 6/17, continue to assess need for Lasix  - f/u official CXR, Echo ordered.  - daily weights and I&Os  - D dimer elevated 615, CT PE Protocol negative for PE.     Problem/Plan - 3:  ·  Problem: Pancytopenia.  Plan: Pancytopenia, Heme service consulted  - Likely secondary to cirrhosis (CT scan revealing cirrhotic liver w/ splenomegaly)  - Hep panel negative, Iron studies wnl, , Retic 4.1  - f/u Folate level and Start folic Acid 1mg  - Abd US 06/18/17 revealed minimally enlarged spleen measuring 13.5 cm. Small liver consistent with cirrhosis.  - f/u further Recs, patient to follow up with Dr. Hayden.   Problem/Plan - 1:  ·  Problem: ACS (acute coronary syndrome).  Plan: CP free today, CE negative x3  - NST 06/19/17 WNL  - Continue ASA 81mg QD  - Lipid Panel WNL.     Problem/Plan - 2:  ·  Problem: Shortness of breath.  Plan: Patient appears euvolemic today, satting well RA  - Echocardiogram in AM, please follow up results  - S/p Lasix 20mg IV x1 on 6/17, continue to assess need for Lasix  - f/u official CXR, Echo ordered.  - daily weights and I&Os  - D dimer elevated 615, CT PE Protocol negative for PE.     Problem/Plan - 3:  ·  Problem: Pancytopenia.  Plan: Pancytopenia, Heme service consulted  - Likely secondary to cirrhosis (CT scan revealing cirrhotic liver w/ splenomegaly)  - Hep panel negative, Iron studies wnl, , Retic 4.1  - f/u Folate level and Start folic Acid 1mg  - Abd US 06/18/17 revealed minimally enlarged spleen measuring 13.5 cm. Small liver consistent with cirrhosis.  - f/u further Recs, patient to follow up with Dr. Hayden. 72yo M, current smoker, former ETOH (currently drinks 1-2 beers/weekly) and polysubstance abuser, with FHx heart disease (mother and fathers family) and PMHx CVA (5yrs ago, no residual deficits), recent Staph infection requiring 5 weeks of IV antibiotics via PICC line as per leonardo who was BIBEMS to [facility] ED on 6/17/17 complaining of 6/10, dull SSCP with associated SOB, palpitations and diaphoresis. Patient reports his family was arguing and he left the house and started walking when his symptoms started.  Patient was admitted for further cardiac work up.  Cardiac enzymes remained negative x3.  Patient remained CP free.  He underwent an Echo revealing EF 60-65% and no significant valve disease.  And nuclear stress test revealing normal mycoardial perfusion images.  Patient recommended to continue ASA 81mg daily.  Patient with elevated D dimer 615.  underwent CT PE protocol negative for PE.  Patient received Lasix 20mg IV x1 for SOB but patient remained euvolemic remainder of hospital course.  On arrival patient noted to have pancytopenia.  Hematology service consulted and likely secondary to liver cirrhosis and CT scan + for cirrhosis and Abd US 06/18/17 revealed minimally enlarged spleen measuring 13.5 cm. Small liver consistent with cirrhosis.  Patient recommended to continue folic acid and follow up with Dr. Hayden.  Labs, vitals, Meds reviewed with Dr. Hidalgo and patient deemed stable for discharge home.     Of note upon discharge called daughter Shaniceher Gramajo to discuss discharge plan.  Daughter states family was not aware of where patient has been and police have been involved.  Daughter states patient is a flight risk and tends to leave home to use alcohol.  As discussed with SW patient is likely not a candidate for [facility] as he is without physical needs.  Recommended patient to discuss with outpatient PMD regarding possible 24hrs aid or visiting nurse.  If patient comes back to [facility] please call daughter Shanice as soon as possible - [phone]

## 2017-06-20 NOTE — DISCHARGE NOTE ADULT - CARE PROVIDER_API CALL
Jose Roberto Hayden), Hematology; Internal Medicine; Medical Oncology  [address] [place] [address]  Phone: [phone]  Fax: [phone]

## 2017-06-22 DIAGNOSIS — R07.9 CHEST PAIN, UNSPECIFIED: ICD-10-CM

## 2017-06-22 DIAGNOSIS — F10.21 ALCOHOL DEPENDENCE, IN REMISSION: ICD-10-CM

## 2017-06-22 DIAGNOSIS — Z86.73 PERSONAL HISTORY OF TRANSIENT ISCHEMIC ATTACK (TIA), AND CEREBRAL INFARCTION WITHOUT RESIDUAL DEFICITS: ICD-10-CM

## 2017-06-22 DIAGNOSIS — D69.6 THROMBOCYTOPENIA, UNSPECIFIED: ICD-10-CM

## 2017-06-22 DIAGNOSIS — F14.10 COCAINE ABUSE, UNCOMPLICATED: ICD-10-CM

## 2017-06-22 DIAGNOSIS — Z88.0 ALLERGY STATUS TO PENICILLIN: ICD-10-CM

## 2017-06-22 DIAGNOSIS — F17.200 NICOTINE DEPENDENCE, UNSPECIFIED, UNCOMPLICATED: ICD-10-CM

## 2018-04-21 NOTE — PROGRESS NOTE ADULT - PROBLEM SELECTOR PROBLEM 2
Anemia, unspecified type
Shortness of breath
Shortness of breath
Prophylactic measure

## 2019-03-11 NOTE — ED ADULT NURSE NOTE - CAS DISCH ACCOMP BY
Detail Level: Simple Consent: The patient's consent was obtained including but not limited to risks of crusting, scabbing, blistering, scarring, darker or lighter pigmentary change, recurrence, incomplete removal and infection. Number Of Freeze-Thaw Cycles: 1 freeze-thaw cycle Render Post-Care Instructions In Note?: no Post-Care Instructions: I reviewed with the patient in detail post-care instructions. Patient is to wear sunprotection, and avoid picking at any of the treated lesions. Patient may apply Polysporin or Vaseline to crusted or scabbing areas. Duration Of Freeze Thaw-Cycle (Seconds): 0 RN/Transporter

## 2022-06-01 NOTE — ED ADULT NURSE NOTE - GASTROINTESTINAL ASSESSMENT
Detail Level: Zone Benzoyl Peroxide Counseling: Patient counseled that medicine may cause skin irritation and bleach clothing.  In the event of skin irritation, the patient was advised to reduce the amount of the drug applied or use it less frequently.   The patient verbalized understanding of the proper use and possible adverse effects of benzoyl peroxide.  All of the patient's questions and concerns were addressed. Sarecycline Pregnancy And Lactation Text: This medication is Pregnancy Category D and not consider safe during pregnancy. It is also excreted in breast milk. Topical Sulfur Applications Pregnancy And Lactation Text: This medication is Pregnancy Category C and has an unknown safety profile during pregnancy. It is unknown if this topical medication is excreted in breast milk. High Dose Vitamin A Pregnancy And Lactation Text: High dose vitamin A therapy is contraindicated during pregnancy and breast feeding. Azithromycin Pregnancy And Lactation Text: This medication is considered safe during pregnancy and is also secreted in breast milk. Use Enhanced Medication Counseling?: No Erythromycin Counseling:  I discussed with the patient the risks of erythromycin including but not limited to GI upset, allergic reaction, drug rash, diarrhea, increase in liver enzymes, and yeast infections. Birth Control Pills Pregnancy And Lactation Text: This medication should be avoided if pregnant and for the first 30 days post-partum. High Dose Vitamin A Counseling: Side effects reviewed, pt to contact office should one occur. Azithromycin Counseling:  I discussed with the patient the risks of azithromycin including but not limited to GI upset, allergic reaction, drug rash, diarrhea, and yeast infections. Aklief counseling:  Patient advised to apply a pea-sized amount only at bedtime and wait 30 minutes after washing their face before applying.  If too drying, patient may add a non-comedogenic moisturizer.  The most commonly reported side effects including irritation, redness, scaling, dryness, stinging, burning, itching, and increased risk of sunburn.  The patient verbalized understanding of the proper use and possible adverse effects of retinoids.  All of the patient's questions and concerns were addressed. Tazorac Pregnancy And Lactation Text: This medication is not safe during pregnancy. It is unknown if this medication is excreted in breast milk. Minocycline Counseling: Patient advised regarding possible photosensitivity and discoloration of the teeth, skin, lips, tongue and gums.  Patient instructed to avoid sunlight, if possible.  When exposed to sunlight, patients should wear protective clothing, sunglasses, and sunscreen.  The patient was instructed to call the office immediately if the following severe adverse effects occur:  hearing changes, easy bruising/bleeding, severe headache, or vision changes.  The patient verbalized understanding of the proper use and possible adverse effects of minocycline.  All of the patient's questions and concerns were addressed. Bactrim Counseling:  I discussed with the patient the risks of sulfa antibiotics including but not limited to GI upset, allergic reaction, drug rash, diarrhea, dizziness, photosensitivity, and yeast infections.  Rarely, more serious reactions can occur including but not limited to aplastic anemia, agranulocytosis, methemoglobinemia, blood dyscrasias, liver or kidney failure, lung infiltrates or desquamative/blistering drug rashes. Topical Clindamycin Counseling: Patient counseled that this medication may cause skin irritation or allergic reactions.  In the event of skin irritation, the patient was advised to reduce the amount of the drug applied or use it less frequently.   The patient verbalized understanding of the proper use and possible adverse effects of clindamycin.  All of the patient's questions and concerns were addressed. Benzoyl Peroxide Pregnancy And Lactation Text: This medication is Pregnancy Category C. It is unknown if benzoyl peroxide is excreted in breast milk. Erythromycin Pregnancy And Lactation Text: This medication is Pregnancy Category B and is considered safe during pregnancy. It is also excreted in breast milk. Dapsone Pregnancy And Lactation Text: This medication is Pregnancy Category C and is not considered safe during pregnancy or breast feeding. Winlevi Counseling:  I discussed with the patient the risks of topical clascoterone including but not limited to erythema, scaling, itching, and stinging. Patient voiced their understanding. Winlevi Pregnancy And Lactation Text: This medication is considered safe during pregnancy and breastfeeding. Dapsone Counseling: I discussed with the patient the risks of dapsone including but not limited to hemolytic anemia, agranulocytosis, rashes, methemoglobinemia, kidney failure, peripheral neuropathy, headaches, GI upset, and liver toxicity.  Patients who start dapsone require monitoring including baseline LFTs and weekly CBCs for the first month, then every month thereafter.  The patient verbalized understanding of the proper use and possible adverse effects of dapsone.  All of the patient's questions and concerns were addressed. Spironolactone Counseling: Patient advised regarding risks of diarrhea, abdominal pain, hyperkalemia, birth defects (for female patients), liver toxicity and renal toxicity. The patient may need blood work to monitor liver and kidney function and potassium levels while on therapy. The patient verbalized understanding of the proper use and possible adverse effects of spironolactone.  All of the patient's questions and concerns were addressed. Aklief Pregnancy And Lactation Text: It is unknown if this medication is safe to use during pregnancy.  It is unknown if this medication is excreted in breast milk.  Breastfeeding women should use the topical cream on the smallest area of the skin for the shortest time needed while breastfeeding.  Do not apply to nipple and areola. Doxycycline Counseling:  Patient counseled regarding possible photosensitivity and increased risk for sunburn.  Patient instructed to avoid sunlight, if possible.  When exposed to sunlight, patients should wear protective clothing, sunglasses, and sunscreen.  The patient was instructed to call the office immediately if the following severe adverse effects occur:  hearing changes, easy bruising/bleeding, severe headache, or vision changes.  The patient verbalized understanding of the proper use and possible adverse effects of doxycycline.  All of the patient's questions and concerns were addressed. Bactrim Pregnancy And Lactation Text: This medication is Pregnancy Category D and is known to cause fetal risk.  It is also excreted in breast milk. Topical Retinoid counseling:  Patient advised to apply a pea-sized amount only at bedtime and wait 30 minutes after washing their face before applying.  If too drying, patient may add a non-comedogenic moisturizer. The patient verbalized understanding of the proper use and possible adverse effects of retinoids.  All of the patient's questions and concerns were addressed. Topical Retinoid Pregnancy And Lactation Text: This medication is Pregnancy Category C. It is unknown if this medication is excreted in breast milk. Azelaic Acid Counseling: Patient counseled that medicine may cause skin irritation and to avoid applying near the eyes.  In the event of skin irritation, the patient was advised to reduce the amount of the drug applied or use it less frequently.   The patient verbalized understanding of the proper use and possible adverse effects of azelaic acid.  All of the patient's questions and concerns were addressed. Isotretinoin Counseling: Patient should get monthly blood tests, not donate blood, not drive at night if vision affected, not share medication, and not undergo elective surgery for 6 months after tx completed. Side effects reviewed, pt to contact office should one occur. Topical Clindamycin Pregnancy And Lactation Text: This medication is Pregnancy Category B and is considered safe during pregnancy. It is unknown if it is excreted in breast milk. Spironolactone Pregnancy And Lactation Text: This medication can cause feminization of the male fetus and should be avoided during pregnancy. The active metabolite is also found in breast milk. Topical Sulfur Applications Counseling: Topical Sulfur Counseling: Patient counseled that this medication may cause skin irritation or allergic reactions.  In the event of skin irritation, the patient was advised to reduce the amount of the drug applied or use it less frequently.   The patient verbalized understanding of the proper use and possible adverse effects of topical sulfur application.  All of the patient's questions and concerns were addressed. Doxycycline Pregnancy And Lactation Text: This medication is Pregnancy Category D and not consider safe during pregnancy. It is also excreted in breast milk but is considered safe for shorter treatment courses. Sarecycline Counseling: Patient advised regarding possible photosensitivity and discoloration of the teeth, skin, lips, tongue and gums.  Patient instructed to avoid sunlight, if possible.  When exposed to sunlight, patients should wear protective clothing, sunglasses, and sunscreen.  The patient was instructed to call the office immediately if the following severe adverse effects occur:  hearing changes, easy bruising/bleeding, severe headache, or vision changes.  The patient verbalized understanding of the proper use and possible adverse effects of sarecycline.  All of the patient's questions and concerns were addressed. Isotretinoin Pregnancy And Lactation Text: This medication is Pregnancy Category X and is considered extremely dangerous during pregnancy. It is unknown if it is excreted in breast milk. Tetracycline Counseling: Patient counseled regarding possible photosensitivity and increased risk for sunburn.  Patient instructed to avoid sunlight, if possible.  When exposed to sunlight, patients should wear protective clothing, sunglasses, and sunscreen.  The patient was instructed to call the office immediately if the following severe adverse effects occur:  hearing changes, easy bruising/bleeding, severe headache, or vision changes.  The patient verbalized understanding of the proper use and possible adverse effects of tetracycline.  All of the patient's questions and concerns were addressed. Patient understands to avoid pregnancy while on therapy due to potential birth defects. Tazorac Counseling:  Patient advised that medication is irritating and drying.  Patient may need to apply sparingly and wash off after an hour before eventually leaving it on overnight.  The patient verbalized understanding of the proper use and possible adverse effects of tazorac.  All of the patient's questions and concerns were addressed. Birth Control Pills Counseling: Birth Control Pill Counseling: I discussed with the patient the potential side effects of OCPs including but not limited to increased risk of stroke, heart attack, thrombophlebitis, deep venous thrombosis, hepatic adenomas, breast changes, GI upset, headaches, and depression.  The patient verbalized understanding of the proper use and possible adverse effects of OCPs. All of the patient's questions and concerns were addressed. Azelaic Acid Pregnancy And Lactation Text: This medication is considered safe during pregnancy and breast feeding. WDL

## 2023-12-12 NOTE — ED ADULT NURSE NOTE - JUGULAR VENOUS DISTENTION
50-year-old male with history of HTN, HLD, DM, hypothyroid, schizoaffective disorder, sent from [facility] for evaluation s/p fall earlier today.  Patient was recently admitted to hospital 4 days ago for pneumonia, was d/c'd living facility yesterday.  Patient states he lost his balance and fell, hit head on wall.  Denies any LOC. 50-year-old male with history of HTN, HLD, DM, hypothyroid, schizoaffective disorder, sent from [facility] for evaluation s/p fall earlier today.  Patient was recently admitted to hospital 4 days ago for pneumonia, was d/c'd living facility yesterday.  Patient states he lost his balance and fell, hit head on wall.  Denies any LOC. absent see mdm.